# Patient Record
Sex: MALE | Race: OTHER | HISPANIC OR LATINO
[De-identification: names, ages, dates, MRNs, and addresses within clinical notes are randomized per-mention and may not be internally consistent; named-entity substitution may affect disease eponyms.]

---

## 2021-02-09 PROBLEM — Z00.00 ENCOUNTER FOR PREVENTIVE HEALTH EXAMINATION: Status: ACTIVE | Noted: 2021-02-09

## 2021-02-10 ENCOUNTER — APPOINTMENT (OUTPATIENT)
Dept: BARIATRICS | Facility: CLINIC | Age: 31
End: 2021-02-10
Payer: COMMERCIAL

## 2021-02-10 VITALS
TEMPERATURE: 97.3 F | SYSTOLIC BLOOD PRESSURE: 124 MMHG | HEART RATE: 90 BPM | DIASTOLIC BLOOD PRESSURE: 78 MMHG | BODY MASS INDEX: 39.17 KG/M2 | HEIGHT: 75 IN | WEIGHT: 315 LBS | OXYGEN SATURATION: 97 %

## 2021-02-10 DIAGNOSIS — Z86.59 PERSONAL HISTORY OF OTHER MENTAL AND BEHAVIORAL DISORDERS: ICD-10-CM

## 2021-02-10 PROCEDURE — 99072 ADDL SUPL MATRL&STAF TM PHE: CPT

## 2021-02-10 PROCEDURE — 99203 OFFICE O/P NEW LOW 30 MIN: CPT

## 2021-02-10 NOTE — ADDENDUM
[FreeTextEntry1] : This note was written by Suzanne Flores on 02/10/2021 acting as scribe for Dr. Ulloa.

## 2021-02-10 NOTE — ASSESSMENT
[FreeTextEntry1] : Feliciano Montemayor here for Class III morbid obesity, sleep apnea (on CPAP for 2 years), HTN, ADHD, anxiety and no significant PSHx.  Interested in VSG. No significant GERD symptomatology. I advised pt to obtain a letter of recommendation from his psychiatrist. He will also see our dietician and psych. Will order blood work and UGI series and after testing, will move forward with surgery certification.

## 2021-02-10 NOTE — HISTORY OF PRESENT ILLNESS
[de-identified] : Feliciano Montemayor comes to see us today for Class III morbid obesity, sleep apnea (on CPAP for 2 years), HTN, ADHD (on 60 mg Adderall daily), anxiety (on 2 mg Xanax BID) and no significant PSHx. Pt states he's always been overweight and is at his highest weight currently. He has tried several diet and exercise regimens including doing Cross-Fit, Weight Watchers and macro diets but always plateaus and then has recidivism. States he eats generally healthy food but suffers with portion control. He denies significant GERD symptoms after making lifestyle changes. Sees a psychiatrist regularly.

## 2021-02-10 NOTE — END OF VISIT
[FreeTextEntry3] : All medical record entries made by the Scribe were at my, Dr. Ulloa's, discretion and personally dictated by me on 02/10/2021. I have reviewed the chart and agree that the record accurately reflects my personal performance of the history, physical exam, assessment and plan. I have also personally directed, reviewed and agreed to the chart.

## 2021-03-04 ENCOUNTER — APPOINTMENT (OUTPATIENT)
Dept: BARIATRICS | Facility: CLINIC | Age: 31
End: 2021-03-04
Payer: COMMERCIAL

## 2021-03-04 VITALS — WEIGHT: 315 LBS | BODY MASS INDEX: 46.5 KG/M2

## 2021-03-04 DIAGNOSIS — E66.01 MORBID (SEVERE) OBESITY DUE TO EXCESS CALORIES: ICD-10-CM

## 2021-03-04 PROCEDURE — 99072 ADDL SUPL MATRL&STAF TM PHE: CPT

## 2021-03-04 PROCEDURE — 97802 MEDICAL NUTRITION INDIV IN: CPT

## 2021-05-14 ENCOUNTER — NON-APPOINTMENT (OUTPATIENT)
Age: 31
End: 2021-05-14

## 2021-05-25 ENCOUNTER — NON-APPOINTMENT (OUTPATIENT)
Age: 31
End: 2021-05-25

## 2021-05-26 ENCOUNTER — EMERGENCY (EMERGENCY)
Facility: HOSPITAL | Age: 31
LOS: 1 days | Discharge: LEFT BEFORE DISCHARGE | End: 2021-05-26
Admitting: EMERGENCY MEDICINE
Payer: COMMERCIAL

## 2021-05-26 ENCOUNTER — APPOINTMENT (OUTPATIENT)
Dept: BARIATRICS | Facility: CLINIC | Age: 31
End: 2021-05-26

## 2021-05-26 VITALS
RESPIRATION RATE: 16 BRPM | HEIGHT: 75 IN | WEIGHT: 309.97 LBS | OXYGEN SATURATION: 97 % | HEART RATE: 80 BPM | DIASTOLIC BLOOD PRESSURE: 92 MMHG | TEMPERATURE: 98 F | SYSTOLIC BLOOD PRESSURE: 157 MMHG

## 2021-05-26 DIAGNOSIS — Z53.21 PROCEDURE AND TREATMENT NOT CARRIED OUT DUE TO PATIENT LEAVING PRIOR TO BEING SEEN BY HEALTH CARE PROVIDER: ICD-10-CM

## 2021-05-26 DIAGNOSIS — S61.419A LACERATION WITHOUT FOREIGN BODY OF UNSPECIFIED HAND, INITIAL ENCOUNTER: ICD-10-CM

## 2021-05-26 PROCEDURE — L9991: CPT

## 2021-05-26 PROCEDURE — 99281 EMR DPT VST MAYX REQ PHY/QHP: CPT

## 2021-05-27 ENCOUNTER — LABORATORY RESULT (OUTPATIENT)
Age: 31
End: 2021-05-27

## 2021-09-23 ENCOUNTER — EMERGENCY (EMERGENCY)
Facility: HOSPITAL | Age: 31
LOS: 1 days | Discharge: HOME | End: 2021-09-23
Attending: EMERGENCY MEDICINE | Admitting: EMERGENCY MEDICINE
Payer: MEDICAID

## 2021-09-23 VITALS
WEIGHT: 315 LBS | RESPIRATION RATE: 18 BRPM | OXYGEN SATURATION: 97 % | TEMPERATURE: 98 F | DIASTOLIC BLOOD PRESSURE: 73 MMHG | SYSTOLIC BLOOD PRESSURE: 140 MMHG | HEIGHT: 75 IN | HEART RATE: 106 BPM

## 2021-09-23 VITALS
RESPIRATION RATE: 16 BRPM | DIASTOLIC BLOOD PRESSURE: 91 MMHG | HEART RATE: 104 BPM | SYSTOLIC BLOOD PRESSURE: 152 MMHG | OXYGEN SATURATION: 97 %

## 2021-09-23 DIAGNOSIS — S01.112A LACERATION WITHOUT FOREIGN BODY OF LEFT EYELID AND PERIOCULAR AREA, INITIAL ENCOUNTER: ICD-10-CM
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
## 2021-09-23 DIAGNOSIS — M25.512 PAIN IN LEFT SHOULDER: ICD-10-CM

## 2021-09-23 DIAGNOSIS — M79.602 PAIN IN LEFT ARM: ICD-10-CM

## 2021-09-23 DIAGNOSIS — S42.002A FRACTURE OF UNSPECIFIED PART OF LEFT CLAVICLE, INITIAL ENCOUNTER FOR CLOSED FRACTURE: ICD-10-CM

## 2021-09-23 PROCEDURE — 70450 CT HEAD/BRAIN W/O DYE: CPT | Mod: MC

## 2021-09-23 PROCEDURE — 73000 X-RAY EXAM OF COLLAR BONE: CPT

## 2021-09-23 PROCEDURE — 73030 X-RAY EXAM OF SHOULDER: CPT

## 2021-09-23 PROCEDURE — 73030 X-RAY EXAM OF SHOULDER: CPT | Mod: 26

## 2021-09-23 PROCEDURE — 99285 EMERGENCY DEPT VISIT HI MDM: CPT

## 2021-09-23 PROCEDURE — 96372 THER/PROPH/DIAG INJ SC/IM: CPT | Mod: XU

## 2021-09-23 PROCEDURE — 99284 EMERGENCY DEPT VISIT MOD MDM: CPT | Mod: 25

## 2021-09-23 PROCEDURE — 73000 X-RAY EXAM OF COLLAR BONE: CPT | Mod: 26,LT

## 2021-09-23 PROCEDURE — 70450 CT HEAD/BRAIN W/O DYE: CPT | Mod: 26,MC

## 2021-09-23 RX ORDER — CHOLECALCIFEROL (VITAMIN D3) 125 MCG
1 CAPSULE ORAL
Qty: 30 | Refills: 0
Start: 2021-09-23 | End: 2021-10-22

## 2021-09-23 RX ORDER — OXYCODONE AND ACETAMINOPHEN 5; 325 MG/1; MG/1
1 TABLET ORAL ONCE
Refills: 0 | Status: DISCONTINUED | OUTPATIENT
Start: 2021-09-23 | End: 2021-09-23

## 2021-09-23 RX ORDER — MORPHINE SULFATE 50 MG/1
4 CAPSULE, EXTENDED RELEASE ORAL ONCE
Refills: 0 | Status: DISCONTINUED | OUTPATIENT
Start: 2021-09-23 | End: 2021-09-23

## 2021-09-23 RX ORDER — IBUPROFEN 200 MG
1 TABLET ORAL
Qty: 20 | Refills: 0
Start: 2021-09-23

## 2021-09-23 RX ADMIN — MORPHINE SULFATE 4 MILLIGRAM(S): 50 CAPSULE, EXTENDED RELEASE ORAL at 15:49

## 2021-09-23 RX ADMIN — OXYCODONE AND ACETAMINOPHEN 1 TABLET(S): 5; 325 TABLET ORAL at 13:57

## 2021-09-23 NOTE — CONSULT NOTE ADULT - SUBJECTIVE AND OBJECTIVE BOX
Orthopaedic Surgery Consult Note    For Surgeon: Dr Lindsay     HPI:  31yMale  Patient is a 31y old  Male who presents with a chief complaint of left shoulder pain s/p fall in his bathroom last night. Patient states he had episode of "vertigo," became very dizzy lost balance and landed on left side and hit his head. Initially went to bed and woke up this AM with significant pain across left collar bone. Denies numbness, tingling. Notes some pain in his left shoulder. Pain worse with movement. Came into ED for further evaluation of left collarbone pain. Denies injury to RUE or bilateral LE. Denies HA, blurred vision. all other systems negative         Allergies    No Known Allergies    Intolerances      PAST MEDICAL & SURGICAL HISTORY:    MEDICATIONS  (STANDING):    MEDICATIONS  (PRN):      Vital Signs Last 24 Hrs  T(C): 36.9 (23 Sep 2021 13:21), Max: 36.9 (23 Sep 2021 13:21)  T(F): 98.4 (23 Sep 2021 13:21), Max: 98.4 (23 Sep 2021 13:21)  HR: 104 (23 Sep 2021 16:33) (104 - 106)  BP: 152/91 (23 Sep 2021 16:33) (140/73 - 152/91)  BP(mean): --  RR: 16 (23 Sep 2021 16:33) (16 - 18)  SpO2: 97% (23 Sep 2021 16:33) (97% - 97%)    Physical Exam:    Gen: 32 y/o male, well nourished, well developed, NAD  HEENT: + small superficial laceration noted superior border left eyebrow  Resp: normal  effort on room air   CV: no erythema, no ecchymosis, +peripheral pulses  GI: nontender  MSK: Decreased ROM secondary to pain left shoulder  + bony deformity noted mid shaft left clavicle  no skin tenting, no open abrasions noted  + TTP   PROM left elbow within normal limits with pain  nontender left elbow, left wrist  sensation intact to light touch bilateral upper extremities  Right upper extremity exam within normal limits   RP 2+,  brisk capillary refill   strength/AIN/PIN 5/5 bilaterally                   Imaging:   EXAM:  XR CLAVICLE COMPLETE LT                          EXAM:  XR SHOULDER W AXILLA MIN 2V LT                          PROCEDURE DATE:  09/23/2021          INTERPRETATION:  INDICATION: Left shoulder/clavicle pain    TECHNIQUE: 2 views of the left clavicle and 4 views of the left shoulder    COMPARISON: None available    FINDINGS:    There is a fracture of the clavicle at the junction of the middle and distal third of the clavicle. There remains overriding of fracture fragments. The acromioclavicular joint is intact. The joint spaces and alignment are preserved.  There is no focal soft tissue abnormality.      IMPRESSION:    Displaced clavicular fracture.    --- End of Report ---    EMILIA WRIGHT [facility]; Attending Radiologist  This document has been electronically signed. Sep 23 2021  4:46PM      A/P: 31yMale with 2 part displaced left clavicle fx s/p fall at home    Discussed injury, treatment options  Reviewed operative fixation with ORIF, risks and benefits  Discussed option to treat nonoperatively with risk of non union of fx  Patient expressed reservations regarding surgery including ?possible metal allergy given h/o reaction to metal cap on tooth. Patient expresses interest in non surgical modalities to treat the fracture.   Elects to be discharged, follow up outpatient and treat nonsurgically.  Patient expresses understanding of risks    Advise sling to LUE  nonweight bearing LUE  OK for ROM elbow/wrist/digits  Ice to the clavicle fx   pain control PRN  outpatient follow up with Dr. Lindsay within 1-2 weeks     -Discussed with Dr. Lindsay     Ortho Pager [phone]

## 2021-09-23 NOTE — ED PROVIDER NOTE - PATIENT PORTAL LINK FT
You can access the FollowMyHealth Patient Portal offered by [facility] by registering at the following website: [email or web address]. By joining [facility]’s FollowMyHealth portal, you will also be able to view your health information using other applications (apps) compatible with our system.

## 2021-09-23 NOTE — ED ADULT NURSE NOTE - NSIMPLEMENTINTERV_GEN_ALL_ED
Implemented All Fall Risk Interventions:  [place] to call system. Call bell, personal items and telephone within reach. Instruct patient to call for assistance. Room bathroom lighting operational. Non-slip footwear when patient is off stretcher. Physically safe environment: no spills, clutter or unnecessary equipment. Stretcher in lowest position, wheels locked, appropriate side rails in place. Provide visual cue, wrist band, yellow gown, etc. Monitor gait and stability. Monitor for mental status changes and reorient to person, place, and time. Review medications for side effects contributing to fall risk. Reinforce activity limits and safety measures with patient and family.

## 2021-09-23 NOTE — ED PROVIDER NOTE - CARE PROVIDER_API CALL
Tommy Lindsay)  Orthopaedic Surgery  [address] [place] [address], [address]  [place] [address]  Phone: [phone]  Fax: [phone]  Follow Up Time:

## 2021-09-23 NOTE — ED ADULT NURSE NOTE - OBJECTIVE STATEMENT
Pt states he has a L inner ear imbalance that he was diagnosed with for many years. Stated he was offered surgery to fix it but decided risks outweighed benefits. States he has had falls in the past. Fell this morning around 3:30am in his bathroom with marble tiles. States he hit his L forehead and L shoulder. Unable to lift his L arm without pain. Swelling noted above L eye. Denies any vision changes. No LOC.

## 2021-09-23 NOTE — ED PROVIDER NOTE - CLINICAL SUMMARY MEDICAL DECISION MAKING FREE TEXT BOX
Pt with fall night prior from vertigo c/o left clavicle pain - ct head neg, + left clavicle fracture, ortho called to see patient.  Pain meds in ED and sling given to patient. Pt with fall night prior from vertigo c/o left clavicle pain - ct head neg, + left clavicle fracture, ortho called to see patient.  Pain meds in ED and sling given to patient.    Pt offered admission for surgery vs outpt mgmt.  PT requests outpt management.  Will send home with pain meds.  Also requesting script for his regular vit D prescription.

## 2021-09-23 NOTE — ED PROVIDER NOTE - OBJECTIVE STATEMENT
32 y/o m with PMH of vertigo/imbalance problem since childhood presents with fall last night after having episode of vertigo.  PT states he fell and struck left side of head and hurt left clavicle ? Pt states incident occurred last night and pain has been worse today.  ? LOC, no headache or vomiting. Able to ambulate at baseline.  No anticoagulation.

## 2021-09-23 NOTE — ED PROVIDER NOTE - NSFOLLOWUPINSTRUCTIONS_ED_ALL_ED_FT
Take pain medications as prescribed.      Follow up with orthopedist doctor recommended below.          Clavicle Fracture    WHAT YOU NEED TO KNOW:    A clavicle fracture is a crack or break in your clavicle (collarbone).     Shoulder Anatomy         DISCHARGE INSTRUCTIONS:    Seek care immediately if:   •Your shoulder, arm, hand, or fingers turn blue or pale, or feel cold or numb.      •Your pain gets worse, even after rest and medicine.      •Your splint feels tight, or you have increased swelling.      •You cannot move your fingers.      Call your doctor if:   •Your sling or wrap comes off or gets damaged.      •You have questions or concerns about your condition or care.      Medicines: You may need any of the following:   •Acetaminophen decreases pain and fever. It is available without a doctor's order. Ask how much to take and how often to take it. Follow directions. Read the labels of all other medicines you are using to see if they also contain acetaminophen, or ask your doctor or pharmacist. Acetaminophen can cause liver damage if not taken correctly. Do not use more than 4 grams (4,000 milligrams) total of acetaminophen in one day.       •NSAIDs, such as ibuprofen, help decrease swelling, pain, and fever. This medicine is available with or without a doctor's order. NSAIDs can cause stomach bleeding or kidney problems in certain people. If you take blood thinner medicine, always ask your healthcare provider if NSAIDs are safe for you. Always read the medicine label and follow directions.      •Take your medicine as directed. Contact your healthcare provider if you think your medicine is not helping or if you have side effects. Tell him or her if you are allergic to any medicine. Keep a list of the medicines, vitamins, and herbs you take. Include the amounts, and when and why you take them. Bring the list or the pill bottles to follow-up visits. Carry your medicine list with you in case of an emergency.      Sling or brace: You will have a sling or a brace to keep your clavicle from moving while it heals. Ask your healthcare provider for more information on how to care for the sling or brace, including how to adjust it.    Shoulder Sling         Apply ice: Apply ice on your clavicle for 15 to 20 minutes every hour or as directed. Use an ice pack, or put crushed ice in a plastic bag. Cover the bag with a towel before you apply it to your clavicle. Ice decreases swelling and pain.    Activity: Limit activity as directed by your healthcare provider. Slowly start to do more each day as the pain decreases.    Physical therapy Physical therapy may be recommended after your clavicle heals. A physical therapist teaches you exercises to help improve movement and strength, and to decrease pain.    Follow up with your doctor within 1 week or as directed: You may need to return for more x-rays to see how well your clavicle is healing. Write down your questions so you remember to ask them during your visits.       © Copyright [employer] 2021           back to top                          © Copyright [employer] 2021

## 2021-09-23 NOTE — ED ADULT TRIAGE NOTE - OTHER COMPLAINTS
patient reports "I have an inner ear imbalance and I fell yesterday on my marble floor"-- patient reports +head strike with +deformity to L clavicle with limited ROM to LUE